# Patient Record
Sex: MALE | Race: WHITE | NOT HISPANIC OR LATINO | Employment: FULL TIME | URBAN - METROPOLITAN AREA
[De-identification: names, ages, dates, MRNs, and addresses within clinical notes are randomized per-mention and may not be internally consistent; named-entity substitution may affect disease eponyms.]

---

## 2017-10-27 ENCOUNTER — OFFICE VISIT (OUTPATIENT)
Dept: URGENT CARE | Facility: CLINIC | Age: 32
End: 2017-10-27
Payer: COMMERCIAL

## 2017-10-27 VITALS
BODY MASS INDEX: 29.4 KG/M2 | HEART RATE: 60 BPM | TEMPERATURE: 98 F | DIASTOLIC BLOOD PRESSURE: 77 MMHG | HEIGHT: 71 IN | RESPIRATION RATE: 18 BRPM | OXYGEN SATURATION: 98 % | SYSTOLIC BLOOD PRESSURE: 114 MMHG | WEIGHT: 210 LBS

## 2017-10-27 DIAGNOSIS — J02.9 ACUTE PHARYNGITIS, UNSPECIFIED ETIOLOGY: Primary | ICD-10-CM

## 2017-10-27 LAB
CTP QC/QA: YES
S PYO RRNA THROAT QL PROBE: NEGATIVE

## 2017-10-27 PROCEDURE — 87880 STREP A ASSAY W/OPTIC: CPT | Mod: QW,S$GLB,, | Performed by: EMERGENCY MEDICINE

## 2017-10-27 PROCEDURE — 96372 THER/PROPH/DIAG INJ SC/IM: CPT | Mod: S$GLB,,, | Performed by: EMERGENCY MEDICINE

## 2017-10-27 PROCEDURE — 99203 OFFICE O/P NEW LOW 30 MIN: CPT | Mod: 25,S$GLB,, | Performed by: EMERGENCY MEDICINE

## 2017-10-27 RX ORDER — BETAMETHASONE SODIUM PHOSPHATE AND BETAMETHASONE ACETATE 3; 3 MG/ML; MG/ML
9 INJECTION, SUSPENSION INTRA-ARTICULAR; INTRALESIONAL; INTRAMUSCULAR; SOFT TISSUE
Status: COMPLETED | OUTPATIENT
Start: 2017-10-27 | End: 2017-10-27

## 2017-10-27 RX ORDER — CETIRIZINE HYDROCHLORIDE 10 MG/1
10 TABLET ORAL DAILY
COMMUNITY
End: 2020-01-24

## 2017-10-27 RX ORDER — CEFDINIR 300 MG/1
300 CAPSULE ORAL EVERY 12 HOURS
Qty: 14 CAPSULE | Refills: 0 | Status: SHIPPED | OUTPATIENT
Start: 2017-10-27 | End: 2017-11-03

## 2017-10-27 RX ADMIN — BETAMETHASONE SODIUM PHOSPHATE AND BETAMETHASONE ACETATE 9 MG: 3; 3 INJECTION, SUSPENSION INTRA-ARTICULAR; INTRALESIONAL; INTRAMUSCULAR; SOFT TISSUE at 08:10

## 2017-10-28 NOTE — PROGRESS NOTES
"Subjective:       Patient ID: Jesus Nesbitt is a 32 y.o. male.    Vitals:  height is 5' 11" (1.803 m) and weight is 95.3 kg (210 lb). His temperature is 98.3 °F (36.8 °C). His blood pressure is 114/77 and his pulse is 60. His respiration is 18 and oxygen saturation is 98%.     Chief Complaint: Sore Throat    Sore throat, no fever, sinus congestion, mild cough, reports getting worse and not improved with otc antihistamine and mucinex      Sore Throat    This is a new problem. The current episode started in the past 7 days. The problem has been gradually worsening. Neither side of throat is experiencing more pain than the other. There has been no fever. Associated symptoms include congestion and coughing. Pertinent negatives include no abdominal pain, ear pain, headaches, hoarse voice or shortness of breath. Treatments tried: musinex. The treatment provided no relief.     Review of Systems   Constitution: Positive for malaise/fatigue. Negative for chills and fever.   HENT: Positive for congestion and sore throat. Negative for ear pain and hoarse voice.    Eyes: Negative for discharge and redness.   Cardiovascular: Negative for chest pain, dyspnea on exertion and leg swelling.   Respiratory: Positive for cough. Negative for shortness of breath, sputum production and wheezing.    Musculoskeletal: Negative for myalgias.   Gastrointestinal: Negative for abdominal pain and nausea.   Neurological: Negative for headaches.       Objective:      Physical Exam   Constitutional: He is oriented to person, place, and time. He appears well-developed and well-nourished. He is cooperative.  Non-toxic appearance. He does not appear ill. No distress.   HENT:   Head: Normocephalic and atraumatic.   Right Ear: Hearing, tympanic membrane, external ear and ear canal normal.   Left Ear: Hearing, tympanic membrane, external ear and ear canal normal.   Nose: Nose normal. No mucosal edema, rhinorrhea or nasal deformity. No epistaxis. Right " sinus exhibits no maxillary sinus tenderness and no frontal sinus tenderness. Left sinus exhibits no maxillary sinus tenderness and no frontal sinus tenderness.   Mouth/Throat: Uvula is midline and mucous membranes are normal. No trismus in the jaw. Normal dentition. No uvula swelling. No posterior oropharyngeal erythema.   Minimal erythema  Posterior op with mucoasal drainage noted   Eyes: Conjunctivae and lids are normal. No scleral icterus.   Sclera clear bilat   Neck: Trachea normal, full passive range of motion without pain and phonation normal. Neck supple.   Cardiovascular: Normal rate, regular rhythm, normal heart sounds, intact distal pulses and normal pulses.    Pulmonary/Chest: Effort normal and breath sounds normal. No respiratory distress.   Abdominal: Soft. Normal appearance and bowel sounds are normal. There is no tenderness.   Musculoskeletal: Normal range of motion. He exhibits no edema or deformity.   Neurological: He is alert and oriented to person, place, and time. He exhibits normal muscle tone. Coordination normal.   Skin: Skin is warm, dry and intact. He is not diaphoretic. No pallor.   Psychiatric: He has a normal mood and affect. His speech is normal and behavior is normal. Judgment and thought content normal. Cognition and memory are normal.   Nursing note and vitals reviewed.      Assessment:       1. Acute pharyngitis, unspecified etiology        Plan:         Acute pharyngitis, unspecified etiology  -     POCT rapid strep A    Other orders  -     betamethasone acetate-betamethasone sodium phosphate injection 9 mg; Inject 1.5 mLs (9 mg total) into the muscle one time.  -     cefdinir (OMNICEF) 300 MG capsule; Take 1 capsule (300 mg total) by mouth every 12 (twelve) hours.  Dispense: 14 capsule; Refill: 0          Patient Instructions   Celestone given in clinic 1.5 cc  Rapid strep test negative  Motrin 600 mg every 6-8 hours for sore throat  Continue Zyrtec or claritin or  allegra  Continue Mucinex DM twice daily for cough  See Acute Pharyngitis sheet  Cefdinir Rx-fill and take if not much improved in 24-48 hours          Viral Pharyngitis (Sore Throat)    You (or your child, if your child is the patient) have pharyngitis (sore throat). This infection is caused by a virus. It can cause throat pain that is worse when swallowing, aching all over, headache, and fever. The infection may be spread by coughing, kissing, or touching others after touching your mouth or nose. Antibiotic medications do not work against viruses, so they are not used for treating this condition.  Home care  · If your symptoms are severe, rest at home. Return to work or school when you feel well enough.   · Drink plenty of fluids to avoid dehydration.  · For children: Use acetaminophen for fever, fussiness or discomfort. In infants over six months of age, you may use ibuprofen instead of acetaminophen. (NOTE: If your child has chronic liver or kidney disease or ever had a stomach ulcer or GI bleeding, talk with your doctor before using these medicines.) (NOTE: Aspirin should never be used in anyone under 18 years of age who is ill with a fever. It may cause severe liver damage.)   · For adults: You may use acetaminophen or ibuprofen to control pain or fever, unless another medicine was prescribed for this. (NOTE: If you have chronic liver or kidney disease or ever had a stomach ulcer or GI bleeding, talk with your doctor before using these medicines.)  · Throat lozenges or numbing throat sprays can help reduce pain. Gargling with warm salt water will also help reduce throat pain. For this, dissolve 1/2 teaspoon of salt in 1 glass of warm water. To help soothe a sore throat, children can sip on juice or a popsicle. Children 5 years and older can also suck on a lollipop or hard candy.  · Avoid salty or spicy foods, which can be irritating to the throat.  Follow-up care  Follow up with your healthcare provider or our  staff if you are not improving over the next week.  When to seek medical advice  Call your healthcare provider right away if any of these occur:  · Fever as directed by your doctor.  For children, seek care if:  ¨ Your child is of any age and has repeated fevers above 104°F (40°C).  ¨ Your child is younger than 2 years of age and has a fever of 100.4°F (38°C) that continues for more than 1 day.  ¨ Your child is 2 years old or older and has a fever of 100.4°F (38°C) that continues for more than 3 days.  · New or worsening ear pain, sinus pain, or headache  · Painful lumps in the back of neck  · Stiff neck  · Lymph nodes are getting larger  · Inability to swallow liquids, excessive drooling, or inability to open mouth wide due to throat pain  · Signs of dehydration (very dark urine or no urine, sunken eyes, dizziness)  · Trouble breathing or noisy breathing  · Muffled voice  · New rash  · Child appears to be getting sicker  Date Last Reviewed: 4/13/2015  © 9916-8499 [employer]. [address] [place] [address], [place] [address]. All rights reserved. This information is not intended as a substitute for professional medical care. Always follow your healthcare professional's instructions.

## 2017-10-28 NOTE — PATIENT INSTRUCTIONS
Celestone given in clinic 1.5 cc  Rapid strep test negative  Motrin 600 mg every 6-8 hours for sore throat  Continue Zyrtec or claritin or allegra  Continue Mucinex DM twice daily for cough  See Acute Pharyngitis sheet  Cefdinir Rx-fill and take if not much improved in 24-48 hours          Viral Pharyngitis (Sore Throat)    You (or your child, if your child is the patient) have pharyngitis (sore throat). This infection is caused by a virus. It can cause throat pain that is worse when swallowing, aching all over, headache, and fever. The infection may be spread by coughing, kissing, or touching others after touching your mouth or nose. Antibiotic medications do not work against viruses, so they are not used for treating this condition.  Home care  · If your symptoms are severe, rest at home. Return to work or school when you feel well enough.   · Drink plenty of fluids to avoid dehydration.  · For children: Use acetaminophen for fever, fussiness or discomfort. In infants over six months of age, you may use ibuprofen instead of acetaminophen. (NOTE: If your child has chronic liver or kidney disease or ever had a stomach ulcer or GI bleeding, talk with your doctor before using these medicines.) (NOTE: Aspirin should never be used in anyone under 18 years of age who is ill with a fever. It may cause severe liver damage.)   · For adults: You may use acetaminophen or ibuprofen to control pain or fever, unless another medicine was prescribed for this. (NOTE: If you have chronic liver or kidney disease or ever had a stomach ulcer or GI bleeding, talk with your doctor before using these medicines.)  · Throat lozenges or numbing throat sprays can help reduce pain. Gargling with warm salt water will also help reduce throat pain. For this, dissolve 1/2 teaspoon of salt in 1 glass of warm water. To help soothe a sore throat, children can sip on juice or a popsicle. Children 5 years and older can also suck on a lollipop or hard  candy.  · Avoid salty or spicy foods, which can be irritating to the throat.  Follow-up care  Follow up with your healthcare provider or our staff if you are not improving over the next week.  When to seek medical advice  Call your healthcare provider right away if any of these occur:  · Fever as directed by your doctor.  For children, seek care if:  ¨ Your child is of any age and has repeated fevers above 104°F (40°C).  ¨ Your child is younger than 2 years of age and has a fever of 100.4°F (38°C) that continues for more than 1 day.  ¨ Your child is 2 years old or older and has a fever of 100.4°F (38°C) that continues for more than 3 days.  · New or worsening ear pain, sinus pain, or headache  · Painful lumps in the back of neck  · Stiff neck  · Lymph nodes are getting larger  · Inability to swallow liquids, excessive drooling, or inability to open mouth wide due to throat pain  · Signs of dehydration (very dark urine or no urine, sunken eyes, dizziness)  · Trouble breathing or noisy breathing  · Muffled voice  · New rash  · Child appears to be getting sicker  Date Last Reviewed: 4/13/2015  © 0317-1418 [employer]. [address] [place] [address], [place] [address]. All rights reserved. This information is not intended as a substitute for professional medical care. Always follow your healthcare professional's instructions.

## 2019-04-09 ENCOUNTER — OFFICE VISIT (OUTPATIENT)
Dept: URGENT CARE | Facility: CLINIC | Age: 34
End: 2019-04-09
Payer: COMMERCIAL

## 2019-04-09 VITALS
DIASTOLIC BLOOD PRESSURE: 78 MMHG | TEMPERATURE: 97 F | OXYGEN SATURATION: 98 % | SYSTOLIC BLOOD PRESSURE: 117 MMHG | HEART RATE: 60 BPM | RESPIRATION RATE: 19 BRPM | WEIGHT: 203 LBS | HEIGHT: 71 IN | BODY MASS INDEX: 28.42 KG/M2

## 2019-04-09 DIAGNOSIS — R06.2 WHEEZING: ICD-10-CM

## 2019-04-09 DIAGNOSIS — R05.9 COUGH: ICD-10-CM

## 2019-04-09 DIAGNOSIS — J45.901 EXACERBATION OF ASTHMA, UNSPECIFIED ASTHMA SEVERITY, UNSPECIFIED WHETHER PERSISTENT: Primary | ICD-10-CM

## 2019-04-09 PROCEDURE — 99214 PR OFFICE/OUTPT VISIT, EST, LEVL IV, 30-39 MIN: ICD-10-PCS | Mod: 25,S$GLB,, | Performed by: FAMILY MEDICINE

## 2019-04-09 PROCEDURE — 99214 OFFICE O/P EST MOD 30 MIN: CPT | Mod: 25,S$GLB,, | Performed by: FAMILY MEDICINE

## 2019-04-09 PROCEDURE — 96372 PR INJECTION,THERAP/PROPH/DIAG2ST, IM OR SUBCUT: ICD-10-PCS | Mod: S$GLB,,, | Performed by: FAMILY MEDICINE

## 2019-04-09 PROCEDURE — 96372 THER/PROPH/DIAG INJ SC/IM: CPT | Mod: S$GLB,,, | Performed by: FAMILY MEDICINE

## 2019-04-09 PROCEDURE — 3008F PR BODY MASS INDEX (BMI) DOCUMENTED: ICD-10-PCS | Mod: CPTII,S$GLB,, | Performed by: FAMILY MEDICINE

## 2019-04-09 PROCEDURE — 3008F BODY MASS INDEX DOCD: CPT | Mod: CPTII,S$GLB,, | Performed by: FAMILY MEDICINE

## 2019-04-09 RX ORDER — BENZONATATE 200 MG/1
200 CAPSULE ORAL 3 TIMES DAILY PRN
Qty: 30 CAPSULE | Refills: 0 | Status: SHIPPED | OUTPATIENT
Start: 2019-04-09 | End: 2019-04-19

## 2019-04-09 RX ORDER — BETAMETHASONE SODIUM PHOSPHATE AND BETAMETHASONE ACETATE 3; 3 MG/ML; MG/ML
9 INJECTION, SUSPENSION INTRA-ARTICULAR; INTRALESIONAL; INTRAMUSCULAR; SOFT TISSUE
Status: COMPLETED | OUTPATIENT
Start: 2019-04-09 | End: 2019-04-09

## 2019-04-09 RX ORDER — PREDNISONE 20 MG/1
20 TABLET ORAL DAILY
Qty: 7 TABLET | Refills: 0 | Status: SHIPPED | OUTPATIENT
Start: 2019-04-09 | End: 2019-04-16

## 2019-04-09 RX ADMIN — BETAMETHASONE SODIUM PHOSPHATE AND BETAMETHASONE ACETATE 9 MG: 3; 3 INJECTION, SUSPENSION INTRA-ARTICULAR; INTRALESIONAL; INTRAMUSCULAR; SOFT TISSUE at 08:04

## 2019-04-10 NOTE — PATIENT INSTRUCTIONS
"  Asthma (Adult)  Asthma is a disease where the medium and  small air passages within the lung go into spasm and restrict the flow of air. Inflammation and swelling of the airways cause further restriction. During an acute asthma attack, these factors cause difficulty breathing, wheezing, cough and chest tightness.    An asthma attack can be triggered by many things. Common triggers include infections such as the common cold, bronchitis, pneumonia. Irritants such as smoke or pollutants in the air, emotional upset, and exercise can also trigger an attack. In many adults with asthma, allergies to dust, mold, pollen and animal dander can cause an asthma attack. Skipping doses of daily asthma medicine can also bring on an asthma attack.  Asthma can be controlled using the proper medicines prescribed by your healthcare provider and avoiding exposure to known triggers including allergens and irritants.  Home care  · Take prescribed medicine exactly at the times advised. If you need medicine such as from a hand held inhaler or aerosol breathing machine more than every 4 hours, contact your healthcare provider or seek immediate medical attention. If prescribed an antibiotic or prednisone, take all of the medicine as prescribed, even if you are feeling better after a few days.  · Do not smoke. Avoid being exposed to the smoke of others.  · Some people with asthma have worsening of their symptoms when they take aspirin and non-steroidal or fever-reducing medicines like ibuprofen and naproxen. Talk to your healthcare provider if you think this may apply to you.  Follow-up care  Follow up with your healthcare provider, or as advised. Always bring all of your current medicines to any appointments with your healthcare provider. Also bring a complete list of medications even those not taken for asthma. If you do not already have one, talk to your healthcare provider about developing a personalized "Asthma Action Plan."  A " pneumococcal (pneumonia) vaccine and yearly flu shot (every fall) are recommended. Ask your doctor about this.  When to seek medical advice  Call your healthcare provider right away if any of these occur:   · Increased wheezing or shortness of breath  · Need to use your inhalers more often than usual without relief  · Fever of 100.4ºF (38ºC) or higher, or as directed by your healthcare provider  · Coughing up lots of dark-colored or bloody sputum (mucus)  · Chest pain with each breath  · If you use a peak flow meter as part of an Asthma Action Plan, and you are still in the yellow zone (50% to 80%) 15 minutes after using inhaler medicine.  Call 911  Call 911 if any of the following occur  · Trouble walking or talking because of shortness of breath  · If you use a peak flow meter as part of an Asthma Action Plan and you are still in the red zone (less than 50%) 15 minutes after using inhaler medicine  · Lips or fingernails turning gray or blue  Date Last Reviewed: 12/2/2015  © 1349-9107 [employer]. [address] [place] [address], [place] [address]. All rights reserved. This information is not intended as a substitute for professional medical care. Always follow your healthcare professional's instructions.      Follow up with your doctor in a few days as needed.  Return to the urgent care or go to the ER if symptoms get worse.    Klaus Bernabe MD

## 2019-04-10 NOTE — PROGRESS NOTES
"Subjective:       Patient ID: Jesus Nesbitt is a 33 y.o. male.    Vitals:  height is 5' 11" (1.803 m) and weight is 92.1 kg (203 lb). His oral temperature is 97.4 °F (36.3 °C). His blood pressure is 117/78 and his pulse is 60. His respiration is 19 and oxygen saturation is 98%.     Chief Complaint: Cough    Cough   This is a new problem. The current episode started in the past 7 days (2 days). The problem has been unchanged. The problem occurs constantly. The cough is non-productive. Associated symptoms include shortness of breath. Pertinent negatives include no chest pain, chills, ear congestion, ear pain, fever, headaches, heartburn, hemoptysis, myalgias, nasal congestion, postnasal drip, rash, rhinorrhea, sore throat, sweats, weight loss or wheezing. Associated symptoms comments: Chest tightness. The symptoms are aggravated by exercise and lying down. Treatments tried: Albuterol. The treatment provided no relief. His past medical history is significant for asthma.       Constitution: Negative for chills, fatigue and fever.   HENT: Negative for ear pain, congestion, postnasal drip and sore throat.    Neck: Negative for painful lymph nodes.   Cardiovascular: Negative for chest pain.   Eyes: Negative for double vision and blurred vision.   Respiratory: Positive for shortness of breath. Negative for cough, bloody sputum and wheezing.    Gastrointestinal: Negative for nausea, diarrhea and heartburn.   Genitourinary: Negative for dysuria, frequency and urgency.   Musculoskeletal: Negative for joint pain, joint swelling, muscle cramps and muscle ache.   Skin: Negative for color change, pale, rash and erythema.   Allergic/Immunologic: Negative for seasonal allergies.   Neurological: Negative for dizziness, history of vertigo, light-headedness, passing out and headaches.   Hematologic/Lymphatic: Negative for swollen lymph nodes, easy bruising/bleeding and history of blood clots. Does not bruise/bleed easily. "   Psychiatric/Behavioral: Negative for nervous/anxious, sleep disturbance and depression. The patient is not nervous/anxious.        Objective:      Physical Exam   Constitutional: He is oriented to person, place, and time. He appears well-developed and well-nourished.   HENT:   Head: Normocephalic and atraumatic.   Right Ear: External ear normal.   Left Ear: External ear normal.   Mouth/Throat: Oropharynx is clear and moist.   Eyes: Pupils are equal, round, and reactive to light. EOM are normal.   Neck: Normal range of motion. Neck supple.   Cardiovascular: Normal rate, regular rhythm and normal heart sounds. Exam reveals no gallop and no friction rub.   No murmur heard.  Pulmonary/Chest: Breath sounds normal. No respiratory distress. He has no rales.   Mild wheezing in both lung field.   Abdominal: Soft. Bowel sounds are normal. He exhibits no distension. There is no tenderness. There is no guarding.   Musculoskeletal: Normal range of motion. He exhibits no edema, tenderness or deformity.   Lymphadenopathy:     He has no cervical adenopathy.   Neurological: He is alert and oriented to person, place, and time. No cranial nerve deficit. He exhibits normal muscle tone. Coordination normal.   Skin: Skin is warm. Capillary refill takes less than 2 seconds. No rash noted. No erythema. No pallor.   Psychiatric: He has a normal mood and affect. His behavior is normal. Judgment and thought content normal.   Vitals reviewed.      Assessment:       1. Exacerbation of asthma, unspecified asthma severity, unspecified whether persistent    2. Cough    3. Wheezing        Plan:         Exacerbation of asthma, unspecified asthma severity, unspecified whether persistent  -     betamethasone acetate-betamethasone sodium phosphate injection 9 mg  -     predniSONE (DELTASONE) 20 MG tablet; Take 1 tablet (20 mg total) by mouth once daily. for 7 days  Dispense: 7 tablet; Refill: 0    Cough  -     benzonatate (TESSALON) 200 MG capsule;  Take 1 capsule (200 mg total) by mouth 3 (three) times daily as needed for Cough.  Dispense: 30 capsule; Refill: 0    Wheezing  -     betamethasone acetate-betamethasone sodium phosphate injection 9 mg  -     predniSONE (DELTASONE) 20 MG tablet; Take 1 tablet (20 mg total) by mouth once daily. for 7 days  Dispense: 7 tablet; Refill: 0          Patient Instructions     Asthma (Adult)  Asthma is a disease where the medium and  small air passages within the lung go into spasm and restrict the flow of air. Inflammation and swelling of the airways cause further restriction. During an acute asthma attack, these factors cause difficulty breathing, wheezing, cough and chest tightness.    An asthma attack can be triggered by many things. Common triggers include infections such as the common cold, bronchitis, pneumonia. Irritants such as smoke or pollutants in the air, emotional upset, and exercise can also trigger an attack. In many adults with asthma, allergies to dust, mold, pollen and animal dander can cause an asthma attack. Skipping doses of daily asthma medicine can also bring on an asthma attack.  Asthma can be controlled using the proper medicines prescribed by your healthcare provider and avoiding exposure to known triggers including allergens and irritants.  Home care  · Take prescribed medicine exactly at the times advised. If you need medicine such as from a hand held inhaler or aerosol breathing machine more than every 4 hours, contact your healthcare provider or seek immediate medical attention. If prescribed an antibiotic or prednisone, take all of the medicine as prescribed, even if you are feeling better after a few days.  · Do not smoke. Avoid being exposed to the smoke of others.  · Some people with asthma have worsening of their symptoms when they take aspirin and non-steroidal or fever-reducing medicines like ibuprofen and naproxen. Talk to your healthcare provider if you think this may apply to  "you.  Follow-up care  Follow up with your healthcare provider, or as advised. Always bring all of your current medicines to any appointments with your healthcare provider. Also bring a complete list of medications even those not taken for asthma. If you do not already have one, talk to your healthcare provider about developing a personalized "Asthma Action Plan."  A pneumococcal (pneumonia) vaccine and yearly flu shot (every fall) are recommended. Ask your doctor about this.  When to seek medical advice  Call your healthcare provider right away if any of these occur:   · Increased wheezing or shortness of breath  · Need to use your inhalers more often than usual without relief  · Fever of 100.4ºF (38ºC) or higher, or as directed by your healthcare provider  · Coughing up lots of dark-colored or bloody sputum (mucus)  · Chest pain with each breath  · If you use a peak flow meter as part of an Asthma Action Plan, and you are still in the yellow zone (50% to 80%) 15 minutes after using inhaler medicine.  Call 911  Call 911 if any of the following occur  · Trouble walking or talking because of shortness of breath  · If you use a peak flow meter as part of an Asthma Action Plan and you are still in the red zone (less than 50%) 15 minutes after using inhaler medicine  · Lips or fingernails turning gray or blue  Date Last Reviewed: 12/2/2015  © 3205-2240 [employer]. [address] [place] [address]. All rights reserved. This information is not intended as a substitute for professional medical care. Always follow your healthcare professional's instructions.      Follow up with your doctor in a few days as needed.  Return to the urgent care or go to the ER if symptoms get worse.    Klaus Bernabe MD      "

## 2020-01-24 ENCOUNTER — OFFICE VISIT (OUTPATIENT)
Dept: URGENT CARE | Facility: CLINIC | Age: 35
End: 2020-01-24
Payer: COMMERCIAL

## 2020-01-24 VITALS
DIASTOLIC BLOOD PRESSURE: 69 MMHG | RESPIRATION RATE: 15 BRPM | OXYGEN SATURATION: 97 % | HEART RATE: 63 BPM | TEMPERATURE: 97 F | HEIGHT: 71 IN | BODY MASS INDEX: 27.3 KG/M2 | WEIGHT: 195 LBS | SYSTOLIC BLOOD PRESSURE: 103 MMHG

## 2020-01-24 DIAGNOSIS — J45.901 ASTHMA WITH ACUTE EXACERBATION, UNSPECIFIED ASTHMA SEVERITY, UNSPECIFIED WHETHER PERSISTENT: Primary | ICD-10-CM

## 2020-01-24 PROCEDURE — 99214 PR OFFICE/OUTPT VISIT, EST, LEVL IV, 30-39 MIN: ICD-10-PCS | Mod: 25,S$GLB,, | Performed by: NURSE PRACTITIONER

## 2020-01-24 PROCEDURE — 94640 PR INHAL RX, AIRWAY OBST/DX SPUTUM INDUCT: ICD-10-PCS | Mod: S$GLB,,, | Performed by: NURSE PRACTITIONER

## 2020-01-24 PROCEDURE — 99214 OFFICE O/P EST MOD 30 MIN: CPT | Mod: 25,S$GLB,, | Performed by: NURSE PRACTITIONER

## 2020-01-24 PROCEDURE — 94640 AIRWAY INHALATION TREATMENT: CPT | Mod: S$GLB,,, | Performed by: NURSE PRACTITIONER

## 2020-01-24 RX ORDER — IPRATROPIUM BROMIDE 0.5 MG/2.5ML
0.5 SOLUTION RESPIRATORY (INHALATION)
Status: COMPLETED | OUTPATIENT
Start: 2020-01-24 | End: 2020-01-24

## 2020-01-24 RX ORDER — ALBUTEROL SULFATE 1.25 MG/3ML
1.25 SOLUTION RESPIRATORY (INHALATION) EVERY 6 HOURS PRN
Qty: 1 BOX | Refills: 0 | Status: SHIPPED | OUTPATIENT
Start: 2020-01-24 | End: 2021-01-23

## 2020-01-24 RX ORDER — IPRATROPIUM BROMIDE AND ALBUTEROL SULFATE 2.5; .5 MG/3ML; MG/3ML
3 SOLUTION RESPIRATORY (INHALATION) EVERY 6 HOURS PRN
Qty: 1 BOX | Refills: 0 | Status: SHIPPED | OUTPATIENT
Start: 2020-01-24 | End: 2021-01-23

## 2020-01-24 RX ORDER — ALBUTEROL SULFATE 0.83 MG/ML
2.5 SOLUTION RESPIRATORY (INHALATION)
Status: COMPLETED | OUTPATIENT
Start: 2020-01-24 | End: 2020-01-24

## 2020-01-24 RX ORDER — PREDNISONE 20 MG/1
20 TABLET ORAL DAILY
Qty: 6 TABLET | Refills: 0 | Status: SHIPPED | OUTPATIENT
Start: 2020-01-24

## 2020-01-24 RX ADMIN — IPRATROPIUM BROMIDE 0.5 MG: 0.5 SOLUTION RESPIRATORY (INHALATION) at 05:01

## 2020-01-24 RX ADMIN — ALBUTEROL SULFATE 2.5 MG: 0.83 SOLUTION RESPIRATORY (INHALATION) at 05:01

## 2020-01-24 NOTE — PROGRESS NOTES
"Subjective:       Patient ID: Jesus Nesbitt is a 34 y.o. male.    Vitals:  height is 5' 11" (1.803 m) and weight is 88.5 kg (195 lb). His oral temperature is 97.4 °F (36.3 °C). His blood pressure is 103/69 and his pulse is 63. His respiration is 15 and oxygen saturation is 97%.     Chief Complaint: URI    Patient to Urgent Care with signs and symptoms of acute exacerbation of asthma and states he is short of breath O2 saturations greater than 97-98% respiratory rate is 18 states he has a history of dust induced asthma.  Patient given DuoNeb treatments while in clinic there is no wheezing.  No rales there is no congestion. Have given patient education concerning long-term treatment of asthma patient verbalizes understanding.    URI    This is a new problem. The current episode started in the past 7 days. The problem has been gradually worsening. There has been no fever. Associated symptoms include congestion. Pertinent negatives include no coughing, ear pain, nausea, rash, sinus pain, sore throat, vomiting or wheezing. He has tried nothing for the symptoms.       Constitution: Negative for chills, sweating, fatigue and fever.   HENT: Positive for congestion. Negative for ear pain, sinus pain, sinus pressure, sore throat and voice change.    Neck: Negative for painful lymph nodes.   Eyes: Negative for eye redness.   Respiratory: Positive for chest tightness, shortness of breath and asthma. Negative for cough, sputum production, bloody sputum, COPD, stridor and wheezing.    Gastrointestinal: Negative for nausea and vomiting.   Musculoskeletal: Negative for muscle ache.   Skin: Negative for rash.   Allergic/Immunologic: Positive for asthma. Negative for seasonal allergies.   Hematologic/Lymphatic: Negative for swollen lymph nodes.       Objective:      Physical Exam   Constitutional: He is oriented to person, place, and time. He appears well-developed and well-nourished.   HENT:   Head: Normocephalic.   Right Ear: " External ear normal.   Left Ear: External ear normal.   Nose: Nose normal.   Mouth/Throat: Oropharynx is clear and moist.   Eyes: Pupils are equal, round, and reactive to light. Conjunctivae and EOM are normal.   Pulmonary/Chest: Effort normal and breath sounds normal. No stridor. He has no wheezes. He has no rales. He exhibits no tenderness.   Musculoskeletal: Normal range of motion.   Neurological: He is alert and oriented to person, place, and time.   Skin: Skin is warm and dry. Capillary refill takes less than 2 seconds.   Psychiatric: He has a normal mood and affect. His behavior is normal. Judgment and thought content normal.         Assessment:       1. Asthma with acute exacerbation, unspecified asthma severity, unspecified whether persistent        Plan:       Patient Instructions     Asthma (Adult)  Asthma is a disease where the medium and  small air passages within the lung go into spasm and restrict the flow of air. Inflammation and swelling of the airways cause further restriction. During an acute asthma attack, these factors cause difficulty breathing, wheezing, cough and chest tightness.    An asthma attack can be triggered by many things. Common triggers include infections such as the common cold, bronchitis, pneumonia. Irritants such as smoke or pollutants in the air, emotional upset, and exercise can also trigger an attack. In many adults with asthma, allergies to dust, mold, pollen and animal dander can cause an asthma attack. Skipping doses of daily asthma medicine can also bring on an asthma attack.  Asthma can be controlled using the proper medicines prescribed by your healthcare provider and avoiding exposure to known triggers including allergens and irritants.  Home care  · Take prescribed medicine exactly at the times advised. If you need medicine such as from a hand held inhaler or aerosol breathing machine more than every 4 hours, contact your healthcare provider or seek immediate medical  "attention. If prescribed an antibiotic or prednisone, take all of the medicine as prescribed, even if you are feeling better after a few days.  · Do not smoke. Avoid being exposed to the smoke of others.  · Some people with asthma have worsening of their symptoms when they take aspirin and non-steroidal or fever-reducing medicines like ibuprofen and naproxen. Talk to your healthcare provider if you think this may apply to you.  Follow-up care  Follow up with your healthcare provider, or as advised. Always bring all of your current medicines to any appointments with your healthcare provider. Also bring a complete list of medications even those not taken for asthma. If you do not already have one, talk to your healthcare provider about developing a personalized "Asthma Action Plan."  A pneumococcal (pneumonia) vaccine and yearly flu shot (every fall) are recommended. Ask your doctor about this.  When to seek medical advice  Call your healthcare provider right away if any of these occur:   · Increased wheezing or shortness of breath  · Need to use your inhalers more often than usual without relief  · Fever of 100.4ºF (38ºC) or higher, or as directed by your healthcare provider  · Coughing up lots of dark-colored or bloody sputum (mucus)  · Chest pain with each breath  · If you use a peak flow meter as part of an Asthma Action Plan, and you are still in the yellow zone (50% to 80%) 15 minutes after using inhaler medicine.  Call 911  Call 911 if any of the following occur  · Trouble walking or talking because of shortness of breath  · If you use a peak flow meter as part of an Asthma Action Plan and you are still in the red zone (less than 50%) 15 minutes after using inhaler medicine  · Lips or fingernails turning gray or blue  Date Last Reviewed: 12/2/2015  © 1361-7289 [employer]. [address] [place] [address], [place] [address]. All rights reserved. This information is not intended as a substitute for " professional medical care. Always follow your healthcare professional's instructions.            Asthma with acute exacerbation, unspecified asthma severity, unspecified whether persistent  -     albuterol (ACCUNEB) 1.25 mg/3 mL Nebu; Take 3 mLs (1.25 mg total) by nebulization every 6 (six) hours as needed. Rescue  Dispense: 1 Box; Refill: 0  -     albuterol-ipratropium (DUO-NEB) 2.5 mg-0.5 mg/3 mL nebulizer solution; Take 3 mLs by nebulization every 6 (six) hours as needed for Wheezing. Rescue  Dispense: 1 Box; Refill: 0  -     albuterol nebulizer solution 2.5 mg  -     ipratropium 0.02 % nebulizer solution 0.5 mg  -     predniSONE (DELTASONE) 20 MG tablet; Take 1 tablet (20 mg total) by mouth once daily.  Dispense: 6 tablet; Refill: 0

## 2020-01-24 NOTE — PATIENT INSTRUCTIONS
"  Asthma (Adult)  Asthma is a disease where the medium and  small air passages within the lung go into spasm and restrict the flow of air. Inflammation and swelling of the airways cause further restriction. During an acute asthma attack, these factors cause difficulty breathing, wheezing, cough and chest tightness.    An asthma attack can be triggered by many things. Common triggers include infections such as the common cold, bronchitis, pneumonia. Irritants such as smoke or pollutants in the air, emotional upset, and exercise can also trigger an attack. In many adults with asthma, allergies to dust, mold, pollen and animal dander can cause an asthma attack. Skipping doses of daily asthma medicine can also bring on an asthma attack.  Asthma can be controlled using the proper medicines prescribed by your healthcare provider and avoiding exposure to known triggers including allergens and irritants.  Home care  · Take prescribed medicine exactly at the times advised. If you need medicine such as from a hand held inhaler or aerosol breathing machine more than every 4 hours, contact your healthcare provider or seek immediate medical attention. If prescribed an antibiotic or prednisone, take all of the medicine as prescribed, even if you are feeling better after a few days.  · Do not smoke. Avoid being exposed to the smoke of others.  · Some people with asthma have worsening of their symptoms when they take aspirin and non-steroidal or fever-reducing medicines like ibuprofen and naproxen. Talk to your healthcare provider if you think this may apply to you.  Follow-up care  Follow up with your healthcare provider, or as advised. Always bring all of your current medicines to any appointments with your healthcare provider. Also bring a complete list of medications even those not taken for asthma. If you do not already have one, talk to your healthcare provider about developing a personalized "Asthma Action Plan."  A " pneumococcal (pneumonia) vaccine and yearly flu shot (every fall) are recommended. Ask your doctor about this.  When to seek medical advice  Call your healthcare provider right away if any of these occur:   · Increased wheezing or shortness of breath  · Need to use your inhalers more often than usual without relief  · Fever of 100.4ºF (38ºC) or higher, or as directed by your healthcare provider  · Coughing up lots of dark-colored or bloody sputum (mucus)  · Chest pain with each breath  · If you use a peak flow meter as part of an Asthma Action Plan, and you are still in the yellow zone (50% to 80%) 15 minutes after using inhaler medicine.  Call 911  Call 911 if any of the following occur  · Trouble walking or talking because of shortness of breath  · If you use a peak flow meter as part of an Asthma Action Plan and you are still in the red zone (less than 50%) 15 minutes after using inhaler medicine  · Lips or fingernails turning gray or blue  Date Last Reviewed: 12/2/2015  © 3492-2617 [employer]. [address] [place] [address], [place] [address]. All rights reserved. This information is not intended as a substitute for professional medical care. Always follow your healthcare professional's instructions.

## 2021-03-12 ENCOUNTER — IMMUNIZATION (OUTPATIENT)
Dept: PRIMARY CARE CLINIC | Facility: CLINIC | Age: 36
End: 2021-03-12
Payer: COMMERCIAL

## 2021-03-12 DIAGNOSIS — Z23 NEED FOR VACCINATION: Primary | ICD-10-CM

## 2021-03-12 PROCEDURE — 0001A PR IMMUNIZ ADMIN, SARS-COV-2 COVID-19 VACC, 30MCG/0.3ML, 1ST DOSE: CPT | Mod: CV19,S$GLB,, | Performed by: INTERNAL MEDICINE

## 2021-03-12 PROCEDURE — 91300 PR SARS-COV- 2 COVID-19 VACCINE, NO PRSV, 30MCG/0.3ML, IM: ICD-10-PCS | Mod: S$GLB,,, | Performed by: INTERNAL MEDICINE

## 2021-03-12 PROCEDURE — 0001A PR IMMUNIZ ADMIN, SARS-COV-2 COVID-19 VACC, 30MCG/0.3ML, 1ST DOSE: ICD-10-PCS | Mod: CV19,S$GLB,, | Performed by: INTERNAL MEDICINE

## 2021-03-12 PROCEDURE — 91300 PR SARS-COV- 2 COVID-19 VACCINE, NO PRSV, 30MCG/0.3ML, IM: CPT | Mod: S$GLB,,, | Performed by: INTERNAL MEDICINE

## 2021-03-12 RX ADMIN — Medication 0.3 ML: at 03:03

## 2021-04-02 ENCOUNTER — IMMUNIZATION (OUTPATIENT)
Dept: PRIMARY CARE CLINIC | Facility: CLINIC | Age: 36
End: 2021-04-02
Payer: COMMERCIAL

## 2021-04-02 DIAGNOSIS — Z23 NEED FOR VACCINATION: Primary | ICD-10-CM

## 2021-04-02 PROCEDURE — 0002A PR IMMUNIZ ADMIN, SARS-COV-2 COVID-19 VACC, 30MCG/0.3ML, 2ND DOSE: CPT | Mod: CV19,S$GLB,, | Performed by: INTERNAL MEDICINE

## 2021-04-02 PROCEDURE — 91300 PR SARS-COV- 2 COVID-19 VACCINE, NO PRSV, 30MCG/0.3ML, IM: CPT | Mod: S$GLB,,, | Performed by: INTERNAL MEDICINE

## 2021-04-02 PROCEDURE — 0002A PR IMMUNIZ ADMIN, SARS-COV-2 COVID-19 VACC, 30MCG/0.3ML, 2ND DOSE: ICD-10-PCS | Mod: CV19,S$GLB,, | Performed by: INTERNAL MEDICINE

## 2021-04-02 PROCEDURE — 91300 PR SARS-COV- 2 COVID-19 VACCINE, NO PRSV, 30MCG/0.3ML, IM: ICD-10-PCS | Mod: S$GLB,,, | Performed by: INTERNAL MEDICINE

## 2021-04-02 RX ADMIN — Medication 0.3 ML: at 03:04

## 2021-11-04 ENCOUNTER — IMMUNIZATION (OUTPATIENT)
Dept: INTERNAL MEDICINE | Facility: CLINIC | Age: 36
End: 2021-11-04
Payer: COMMERCIAL

## 2021-11-04 DIAGNOSIS — Z23 NEED FOR VACCINATION: Primary | ICD-10-CM

## 2021-11-04 PROCEDURE — 0004A COVID-19, MRNA, LNP-S, PF, 30 MCG/0.3 ML DOSE VACCINE: CPT | Mod: CV19,PBBFAC | Performed by: INTERNAL MEDICINE
